# Patient Record
Sex: FEMALE | Race: WHITE | NOT HISPANIC OR LATINO | Employment: OTHER | URBAN - METROPOLITAN AREA
[De-identification: names, ages, dates, MRNs, and addresses within clinical notes are randomized per-mention and may not be internally consistent; named-entity substitution may affect disease eponyms.]

---

## 2021-03-03 DIAGNOSIS — Z23 NEED FOR VACCINATION: ICD-10-CM

## 2021-12-31 ENCOUNTER — OFFICE VISIT (OUTPATIENT)
Dept: URGENT CARE | Facility: PHYSICIAN GROUP | Age: 66
End: 2021-12-31
Payer: COMMERCIAL

## 2021-12-31 ENCOUNTER — HOSPITAL ENCOUNTER (OUTPATIENT)
Dept: RADIOLOGY | Facility: MEDICAL CENTER | Age: 66
End: 2021-12-31
Attending: PHYSICIAN ASSISTANT
Payer: MEDICARE

## 2021-12-31 VITALS
DIASTOLIC BLOOD PRESSURE: 72 MMHG | HEIGHT: 65 IN | SYSTOLIC BLOOD PRESSURE: 118 MMHG | TEMPERATURE: 97.4 F | WEIGHT: 118 LBS | RESPIRATION RATE: 14 BRPM | BODY MASS INDEX: 19.66 KG/M2 | HEART RATE: 81 BPM | OXYGEN SATURATION: 98 %

## 2021-12-31 DIAGNOSIS — L08.9 TOE INFECTION: ICD-10-CM

## 2021-12-31 DIAGNOSIS — M79.675 PAIN OF TOE OF LEFT FOOT: ICD-10-CM

## 2021-12-31 PROCEDURE — 99214 OFFICE O/P EST MOD 30 MIN: CPT | Performed by: PHYSICIAN ASSISTANT

## 2021-12-31 PROCEDURE — 73660 X-RAY EXAM OF TOE(S): CPT | Mod: LT

## 2021-12-31 RX ORDER — CEPHALEXIN 500 MG/1
500 CAPSULE ORAL 2 TIMES DAILY
Qty: 10 CAPSULE | Refills: 0 | Status: SHIPPED | OUTPATIENT
Start: 2021-12-31 | End: 2022-01-05

## 2021-12-31 ASSESSMENT — ENCOUNTER SYMPTOMS
VOMITING: 0
EYE PAIN: 0
MYALGIAS: 0
SHORTNESS OF BREATH: 0
COUGH: 0
CHILLS: 0
NAUSEA: 0
ABDOMINAL PAIN: 0
CONSTIPATION: 0
HEADACHES: 0
SORE THROAT: 0
FEVER: 0
DIARRHEA: 0

## 2021-12-31 NOTE — PROGRESS NOTES
"Subjective:   Noni Cardoza is a 66 y.o. female who presents for Toe Pain (left foot, red and tender X 3 wks)      Is a pleasant 66-year-old female who complains of left third toe discomfort over the last 3 weeks, she notes that she had a callus which eventually sloughed off and she has had worsening redness and pain surrounding it.  At the initial yvonne 3 weeks ago she did stub her toe.  She denies any fevers or chills.  She is diabetic but has not had any peripheral neuropathy, well-controlled on Metformin      Review of Systems   Constitutional: Negative for chills and fever.   HENT: Negative for congestion, ear pain and sore throat.    Eyes: Negative for pain.   Respiratory: Negative for cough and shortness of breath.    Cardiovascular: Negative for chest pain.   Gastrointestinal: Negative for abdominal pain, constipation, diarrhea, nausea and vomiting.   Genitourinary: Negative for dysuria.   Musculoskeletal: Negative for myalgias.   Skin: Negative for rash.   Neurological: Negative for headaches.       Medications, Allergies, and current problem list reviewed today in Epic.     Objective:     /72 (BP Location: Left arm, Patient Position: Sitting, BP Cuff Size: Adult)   Pulse 81   Temp 36.3 °C (97.4 °F) (Temporal)   Resp 14   Ht 1.651 m (5' 5\")   Wt 53.5 kg (118 lb)   SpO2 98%     Physical Exam  Vitals reviewed.   Constitutional:       Appearance: Normal appearance.   HENT:      Head: Normocephalic and atraumatic.      Right Ear: External ear normal.      Left Ear: External ear normal.      Nose: Nose normal.      Mouth/Throat:      Mouth: Mucous membranes are moist.   Eyes:      Conjunctiva/sclera: Conjunctivae normal.   Cardiovascular:      Rate and Rhythm: Normal rate.   Pulmonary:      Effort: Pulmonary effort is normal.   Skin:     General: Skin is warm and dry.      Capillary Refill: Capillary refill takes less than 2 seconds.      Comments: Left third toe distal phalange E with a ulcer " with healthy granular tissue underneath, mildly macerated tissue surrounding with more proximal erythema.  Range of motion intact.  No malformation or angulation or ecchymosis.   Neurological:      Mental Status: She is alert and oriented to person, place, and time.         RADIOLOGY RESULTS   DX-TOE(S) 2+ LEFT    Result Date: 12/31/2021 12/31/2021 10:37 AM HISTORY/REASON FOR EXAM:  Pain/Deformity Following Trauma. Stubbed LEFT 3rd toe.  Pain. TECHNIQUE/EXAM DESCRIPTION AND NUMBER OF VIEWS:  3 views of the LEFT toes. COMPARISON: None FINDINGS: Swelling of 2nd and 3rd digits. No radiopaque foreign body or soft tissue gas. No fracture or dislocation.     1.  No fracture or dislocation of LEFT 3rd toe. 2.  Swelling of 2nd and 3rd toes noted.           Assessment/Plan:     Diagnosis and associated orders:     1. Pain of toe of left foot  DX-TOE(S) 2+ LEFT   2. Toe infection  cephALEXin (KEFLEX) 500 MG Cap      Comments/MDM:     • No evidence of osteomyelitis of the x-ray is not sensitive for acute osteomyelitis.  All the patient's diabetic I do not suspect this diagnosis.  Differential includes mild frostbite, loss of callus and localized cellulitis.  I recommended nonocclusive nonadherent gauze dressings, with dressing changes regularly.  Do not apply antibiotic ointment.  Allow to dry completely after showering.  Dressing changes 2-3 times daily or whenever foot is saturated.  Expect scant amount of drainage.  Will initiate Keflex therapy for the localized cellulitis, continue monitor symptoms and take pictures daily.  Patient coincidentally has an appoint with her primary in the coming week you continue to monitor symptoms and guide therapy.  Return immediately if worsening especially if any constitutional symptoms develop.         Differential diagnosis, natural history, supportive care, and indications for immediate follow-up discussed.    Advised the patient to follow-up with the primary care physician for  recheck, reevaluation, and consideration of further management.    Please note that this dictation was created using voice recognition software. I have made a reasonable attempt to correct obvious errors, but I expect that there are errors of grammar and possibly content that I did not discover before finalizing the note.    This note was electronically signed by Christopher Burgess PA-C

## 2022-11-03 ENCOUNTER — PATIENT MESSAGE (OUTPATIENT)
Dept: HEALTH INFORMATION MANAGEMENT | Facility: OTHER | Age: 67
End: 2022-11-03

## 2023-07-11 ENCOUNTER — OFFICE VISIT (OUTPATIENT)
Dept: URGENT CARE | Facility: PHYSICIAN GROUP | Age: 68
End: 2023-07-11
Payer: MEDICARE

## 2023-07-11 VITALS
WEIGHT: 122 LBS | RESPIRATION RATE: 16 BRPM | TEMPERATURE: 97.4 F | HEART RATE: 74 BPM | HEIGHT: 65 IN | OXYGEN SATURATION: 100 % | BODY MASS INDEX: 20.33 KG/M2 | DIASTOLIC BLOOD PRESSURE: 64 MMHG | SYSTOLIC BLOOD PRESSURE: 118 MMHG

## 2023-07-11 DIAGNOSIS — H61.21 IMPACTED CERUMEN OF RIGHT EAR: ICD-10-CM

## 2023-07-11 DIAGNOSIS — H66.001 ACUTE SUPPURATIVE OTITIS MEDIA OF RIGHT EAR WITHOUT SPONTANEOUS RUPTURE OF TYMPANIC MEMBRANE, RECURRENCE NOT SPECIFIED: Primary | ICD-10-CM

## 2023-07-11 DIAGNOSIS — H92.03 ACUTE OTALGIA, BILATERAL: ICD-10-CM

## 2023-07-11 PROCEDURE — 3074F SYST BP LT 130 MM HG: CPT | Performed by: PHYSICIAN ASSISTANT

## 2023-07-11 PROCEDURE — 99213 OFFICE O/P EST LOW 20 MIN: CPT | Performed by: PHYSICIAN ASSISTANT

## 2023-07-11 PROCEDURE — 3078F DIAST BP <80 MM HG: CPT | Performed by: PHYSICIAN ASSISTANT

## 2023-07-11 RX ORDER — AMOXICILLIN 500 MG/1
500 CAPSULE ORAL 2 TIMES DAILY
Qty: 14 CAPSULE | Refills: 0 | Status: SHIPPED | OUTPATIENT
Start: 2023-07-11 | End: 2023-07-18

## 2023-07-11 ASSESSMENT — ENCOUNTER SYMPTOMS
FEVER: 0
HEADACHES: 0
CHILLS: 0
RHINORRHEA: 1
SINUS PAIN: 0
SORE THROAT: 0
COUGH: 0

## 2023-07-11 NOTE — PROGRESS NOTES
Subjective     Noni Cardoza is a 67 y.o. female who presents with Ear Pain (X 2 weeks on bilateral ears)    PMH:  has a past medical history of Bronchitis (2012), Cancer (HCC), and Pneumonia (2012).    She has no past medical history of ASTHMA, COPD, or Diabetes.  MEDS:   Current Outpatient Medications:     metFORMIN (GLUCOPHAGE) 500 MG Tab, Take 500 mg by mouth 2 times a day with meals., Disp: , Rfl:     atorvastatin (LIPITOR) 10 MG TABS, Take 10 mg by mouth every evening., Disp: , Rfl:   ALLERGIES: No Known Allergies  SURGHX:   Past Surgical History:   Procedure Laterality Date    MASTECTOMY  12/16/2013    Performed by Lyly Kumar M.D. at [facility]    NODE BIOPSY SENTINEL  12/16/2013    Performed by Lyly Kumar M.D. at [facility]    AXILLARY NODE DISSECTION  12/16/2013    Performed by Lyly Kumar M.D. at [facility]    TISSUE EXPANDER PLACE/REMOVE  12/16/2013    Performed by Mickey Meek M.D. at [facility]     SOCHX:  reports that she has never smoked. She has never used smokeless tobacco. She reports current alcohol use.  FH: Reviewed with patient, not pertinent to this visit.             Patient presents clinic today with complaint of 2 weeks of intermittent, becoming more persistent bilateral pain and pressure.  Patient has some nasal congestion, postnasal drip though she thinks this is allergy in nature.  Patient denies ringing in her ears, dizziness, drainage or discharge from ears, swelling of the canal.  Patient denies any other complaints.    Otalgia   There is pain in both ears. This is a new problem. The current episode started 1 to 4 weeks ago. The problem occurs constantly. The problem has been gradually worsening. There has been no fever. The pain is moderate. Associated symptoms include rhinorrhea. Pertinent negatives include no coughing, ear discharge, headaches, hearing loss or sore throat. She has  "tried nothing for the symptoms. The treatment provided no relief. There is no history of a chronic ear infection, hearing loss or a tympanostomy tube.       Review of Systems   Constitutional:  Negative for chills and fever.   HENT:  Positive for congestion, ear pain and rhinorrhea. Negative for ear discharge, hearing loss, sinus pain, sore throat and tinnitus.    Respiratory:  Negative for cough.    Neurological:  Negative for headaches.   Endo/Heme/Allergies:  Positive for environmental allergies.   All other systems reviewed and are negative.             Objective     /64 (BP Location: Left arm, Patient Position: Sitting, BP Cuff Size: Adult)   Pulse 74   Temp 36.3 °C (97.4 °F) (Temporal)   Resp 16   Ht 1.651 m (5' 5\")   Wt 55.3 kg (122 lb)   SpO2 100%   BMI 20.30 kg/m²      Physical Exam  Vitals and nursing note reviewed.   Constitutional:       General: She is not in acute distress.     Appearance: Normal appearance. She is well-developed and normal weight. She is not ill-appearing.   HENT:      Head: Normocephalic and atraumatic.      Right Ear: There is impacted cerumen.      Left Ear: Hearing, tympanic membrane and ear canal normal.  No middle ear effusion. Tympanic membrane is not injected, erythematous, retracted or bulging.      Nose: Congestion and rhinorrhea present. Rhinorrhea is clear.      Mouth/Throat:      Lips: Pink.      Mouth: Mucous membranes are moist.      Pharynx: Oropharynx is clear. Uvula midline. No oropharyngeal exudate or posterior oropharyngeal erythema.   Eyes:      Extraocular Movements: Extraocular movements intact.      Conjunctiva/sclera: Conjunctivae normal.      Pupils: Pupils are equal, round, and reactive to light.   Cardiovascular:      Rate and Rhythm: Normal rate.   Pulmonary:      Effort: Pulmonary effort is normal.   Musculoskeletal:         General: Normal range of motion.      Cervical back: Normal range of motion and neck supple.   Skin:     General: Skin " is warm and dry.      Capillary Refill: Capillary refill takes less than 2 seconds.   Neurological:      Mental Status: She is alert and oriented to person, place, and time.      Gait: Gait normal.   Psychiatric:         Mood and Affect: Mood normal.                        Assessment & Plan             1. Acute suppurative otitis media of right ear without spontaneous rupture of tympanic membrane, recurrence not specified  amoxicillin (AMOXIL) 500 MG Cap      2. Impacted cerumen of right ear  amoxicillin (AMOXIL) 500 MG Cap      3. Acute otalgia, bilateral  amoxicillin (AMOXIL) 500 MG Cap        Procedure: Cerumen Removal  Risks and benefits of procedure discussed  Cerumen removed with curette and lavage after softening agent instilled  Patient tolerated well  Post procedure exam with clear canal and abnormal normal TM.      Patient HPI and physical exam consistent with cerumen impaction of right ear, once cerumen was removed with lavage and alligator forceps, TM was observed to be retracted erythematous with suppurative otitis media.    I will treat with amoxicillin twice daily x7 days.    PT can begin or continue OTC medications, increase fluids and rest until symptoms improve.     Differential diagnosis, supportive care, and indications for immediate follow-up discussed with patient.  Instructed to return to clinic or nearest emergency department for any change in condition, further concerns, or worsening of symptoms.    I personally reviewed prior external notes and test results pertinent to today's visit.  I have independently reviewed and interpreted all diagnostics ordered during this urgent care visit.    PT should follow up with PCP in 1-2 days for re-evaluation if symptoms have not improved.      Discussed red flags and reasons to return to UC or ED.      Pt and/or family verbalized understanding of diagnosis and follow up instructions and was offered informational handout on diagnosis.  PT discharged.      Please note that this dictation was created using voice recognition software. I have made every reasonable attempt to correct obvious errors, but I expect that there may be errors of grammar and possibly content that I did not discover before finalizing the note.